# Patient Record
(demographics unavailable — no encounter records)

---

## 2025-06-10 NOTE — HISTORY OF PRESENT ILLNESS
[Born at ___ Wks Gestation] : The patient was born at [unfilled] weeks gestation [C/S] : via  section [C/S Indication: ____] : ( [unfilled] ) [[facility]] : at [facility] [(1) _____] : [unfilled] [(5) _____] : [unfilled] [BW: _____] : weight of [unfilled] [Nuchal Cord] : nuchal cord [DW: _____] : Discharge weight was [unfilled] [Time of Birth: _____] : Time of birth was [unfilled] [Age: ___] : [unfilled] year old mother [G: ___] : G [unfilled] [P: ___] : P [unfilled] [Significant Hx: ____] : The mother's  medical history is significant for [unfilled] [Rubella (Immune)] : Rubella immune [MBT: ____] : MBT - [unfilled] [None] : There are no risk factors [TcB: _____] : Transcutaneous Bilirubin [unfilled] mg/dL [Phototherapy Threshold: _____] : Phototherapy level per Bilitool [unfilled] (mg/dL) [Yes] : Yes [RSV vaccine] : RSV vaccine not received by mother at least 14 days prior to delivery [HepBsAG] : HepBsAg negative [HIV] : HIV negative [GBS] : GBS negative [VDRL/RPR (Reactive)] : VDRL/RPR nonreactive [de-identified] : 36 [FreeTextEntry1] :  Breech presentation -- will need hip ultrasound

## 2025-06-11 NOTE — DISCUSSION/SUMMARY
[Normal Development] : developmental [Normal Growth] : growth [No Elimination Concerns] : elimination [Term Infant] : term infant [None] : no known medical problems [ Care] :  care [ Transition] :  transition [Parental Well-Being] : parental well-being [Nutritional Adequacy] : nutritional adequacy [Safety] : safety [No Medications] : ~He/She~ is not on any medications [Hepatitis B In Hospital] : Hepatitis B administered while in the hospital [Mother] : mother [Father] : father [Continue Regimen] : feeding [Normal Sleep Pattern] : sleep [No Skin Concerns] : skin [FreeTextEntry1] :  Diane is a 3 day old AGA ex FT F born via C/S to a  here for her  visit.   Parents do not have any concerns at this time.  She has a normal exam and is developing as expected.  Current feeding regiment is excusively breastmilk, which mom feels is going well.  Summary -PT Received in Hep B #1 at hospital at birth -G6PD screen was normal = 13.9 -Currently  -Postpartum depression screen negative -RTC in 1 week for weight check and repeat [place] - Hip US at 6 weeks for breech presentation

## 2025-06-11 NOTE — PHYSICAL EXAM
[Alert] : alert [Normocephalic] : normocephalic [Flat Open Anterior Ideal] : flat open anterior fontanelle [PERRL] : PERRL [Red Reflex Bilateral] : red reflex bilateral [Normally Placed Ears] : normally placed ears [Auricles Well Formed] : auricles well formed [Clear Tympanic membranes] : clear tympanic membranes [Light reflex present] : light reflex present [Bony structures visible] : bony structures visible [Patent Auditory Canal] : patent auditory canal [Nares Patent] : nares patent [Palate Intact] : palate intact [Uvula Midline] : uvula midline [Supple, full passive range of motion] : supple, full passive range of motion [Symmetric Chest Rise] : symmetric chest rise [Clear to Auscultation Bilaterally] : clear to auscultation bilaterally [Regular Rate and Rhythm] : regular rate and rhythm [S1, S2 present] : S1, S2 present [+2 Femoral Pulses] : +2 femoral pulses [Soft] : soft [Bowel Sounds] : bowel sounds present [Umbilical Stump Dry, Clean, Intact] : umbilical stump dry, clean, intact [Normal external genitalia] : normal external genitalia [Patent Vagina] : patent vagina [Patent] : patent [No Abnormal Lymph Nodes Palpated] : no abnormal lymph nodes palpated [Normally Placed] : normally placed [Symmetric Flexed Extremities] : symmetric flexed extremities [Startle Reflex] : startle reflex present [Suck Reflex] : suck reflex present [Rooting] : rooting reflex present [Palmar Grasp] : palmar grasp present [Plantar Grasp] : plantar reflex present [Symmetric [place]] : symmetric [place] [Erythema Toxicum] : erythema toxicum [Jaundice] : jaundice [Acute Distress] : no acute distress [Discharge] : no discharge [Icteric sclera] : nonicteric sclera [Palpable Masses] : no palpable masses [Tender] : nontender [Murmurs] : no murmurs [Distended] : not distended [Clitoromegaly] : no clitoromegaly [Hepatomegaly] : no hepatomegaly [Splenomegaly] : no splenomegaly [Huizar-Ortolani] : negative Huizar-Ortolani [Spinal Dimple] : no spinal dimple [de-identified] : Minimal facial icterus -- less than discharge according to parents [Tuft of Hair] : no tuft of hair

## 2025-06-11 NOTE — DEVELOPMENTAL MILESTONES
[Normal Development] : Normal Development [Makes brief eye contact] : makes brief eye contact [Calms to adult voice] : calms to adult voice [Reflexively moves arms and legs] : reflexively moves arms and legs [Turns head to side when on stomach] : turns head to side when on stomach [Holds fingers closed] : holds fingers closed [Grasps reflexively] : grasp reflexively [Passed] : passed

## 2025-06-11 NOTE — HISTORY OF PRESENT ILLNESS
[[facility]] : at [facility] [Breast milk] : breast milk [Normal] : Normal [___ voids per day] : [unfilled] voids per day [Frequency of stools: ___] : Frequency of stools: [unfilled]  stools [Tarry] : tarry [Black] : black [In Bassinet/Crib] : sleeps in bassinet/crib [On back] : sleeps on back [No] : No cigarette smoke exposure [Rear facing car seat in back seat] : Rear facing car seat in back seat [Carbon Monoxide Detectors] : Carbon monoxide detectors at home [Smoke Detectors] : Smoke detectors at home. [Hepatitis B Vaccine Given] : Hepatitis B vaccine given [NO] : No [RSV vaccine] : RSV vaccine not received by mother at least 14 days prior to delivery [HepBsAG] : HepBsAg negative [HIV] : HIV negative [GBS] : GBS negative [FreeTextEntry3] : synthroid, vaginal progesterone, prenatal vitamins [VDRL/RPR (Reactive)] : VDRL/RPR nonreactive [de-identified] : 36 [Co-sleeping] : no co-sleeping [Loose bedding, pillow, toys, and/or bumpers in crib] : no loose bedding, pillow, toys, and/or bumpers in crib [Pacifier] : Not using pacifier [Exposure to electronic nicotine delivery system] : No exposure to electronic nicotine delivery system [FreeTextEntry7] : 2nd time parents [de-identified] : None;  [de-identified] : 1.5-3 hours,10 mins to 30 mins  [FreeTextEntry1] : Breech presentation -- will need hip ultrasound at 6 weeks  CCHD screen passed Hearing screen passed NB# [id number] G6PD screen sent in nursery  Bili = 7.8 at 36 hours (photo threshold = 14.2)  Mom is OB/Gyn at [employer] Dad is Anesthesiologist at [employer]

## 2025-07-08 NOTE — HISTORY OF PRESENT ILLNESS
[Mother] : mother [Normal] : Normal [In Bassinet/Crib] : sleeps in bassinet/crib [On back] : sleeps on back [Co-sleeping] : no co-sleeping [Loose bedding, pillow, toys, and/or bumpers in crib] : no loose bedding, pillow, toys, and/or bumpers in crib [de-identified] : Breastfeeding exclusively, feeding ad sarbjit, gaining weight [FreeTextEntry9] : Started to do tummy time.  [FreeTextEntry1] : FOC and MOC at home, sibling with home.

## 2025-07-08 NOTE — DEVELOPMENTAL MILESTONES
[Passed] : passed [FreeTextEntry2] : 0 [Normal Development] : Normal Development [None] : none [Calms when picked up or spoken to] : calms when picked up or spoken to [Looks briefly at objects] : looks briefly at objects [Makes brief short vowel sounds] : makes brief short vowel sounds
